# Patient Record
Sex: FEMALE | Race: BLACK OR AFRICAN AMERICAN | NOT HISPANIC OR LATINO | URBAN - METROPOLITAN AREA
[De-identification: names, ages, dates, MRNs, and addresses within clinical notes are randomized per-mention and may not be internally consistent; named-entity substitution may affect disease eponyms.]

---

## 2023-08-23 LAB
APPEARANCE, URINE: ABNORMAL
BILIRUBIN, URINE: NEGATIVE
BLOOD, URINE: NEGATIVE
COLOR, URINE: YELLOW
GLUCOSE, URINE: ABNORMAL MG/DL
KETONES, URINE: NEGATIVE MG/DL
LEUKOCYTE ESTERASE, URINE: NEGATIVE
NITRITE, URINE: NEGATIVE
PH, URINE: 6 (ref 5–8)
PROTEIN, URINE: NEGATIVE MG/DL
SPECIFIC GRAVITY, URINE: 1.02 (ref 1–1.03)
UROBILINOGEN, URINE: <2 MG/DL (ref 0–1.9)

## 2023-08-25 LAB — URINE CULTURE: ABNORMAL

## 2023-10-15 PROBLEM — R39.89 ABNORMAL COMPLIANCE OF BLADDER: Status: ACTIVE | Noted: 2023-10-15

## 2023-10-15 PROBLEM — N39.46 MIXED STRESS AND URGE URINARY INCONTINENCE: Status: ACTIVE | Noted: 2023-10-15

## 2023-10-15 PROBLEM — R32 URINARY INCONTINENCE: Status: ACTIVE | Noted: 2023-10-15

## 2023-10-15 PROBLEM — N95.2 ATROPHIC VAGINITIS: Status: ACTIVE | Noted: 2023-10-15

## 2023-10-15 PROBLEM — N81.11 MIDLINE CYSTOCELE: Status: ACTIVE | Noted: 2023-10-15

## 2023-10-15 PROBLEM — R92.8 ABNORMAL MAMMOGRAM: Status: ACTIVE | Noted: 2023-10-15

## 2023-10-15 PROBLEM — R35.1 NOCTURIA: Status: ACTIVE | Noted: 2023-10-15

## 2023-10-15 PROBLEM — N81.89 PELVIC FLOOR WEAKNESS: Status: ACTIVE | Noted: 2023-10-15

## 2023-10-15 PROBLEM — R10.2 PELVIC PAIN IN FEMALE: Status: ACTIVE | Noted: 2023-10-15

## 2023-10-15 PROBLEM — I10 HYPERTENSION: Status: ACTIVE | Noted: 2023-10-15

## 2023-10-15 PROBLEM — N81.10 CYSTOCELE, UNSPECIFIED: Status: ACTIVE | Noted: 2023-10-15

## 2023-10-15 RX ORDER — METOPROLOL TARTRATE 100 MG/1
TABLET ORAL
COMMUNITY
Start: 2014-07-14

## 2023-10-15 RX ORDER — HYDROCHLOROTHIAZIDE 25 MG/1
TABLET ORAL
COMMUNITY
Start: 2015-10-19

## 2023-10-15 RX ORDER — METFORMIN HYDROCHLORIDE 1000 MG/1
TABLET ORAL
COMMUNITY
Start: 2014-07-14

## 2023-10-15 RX ORDER — AMLODIPINE BESYLATE 5 MG/1
TABLET ORAL
COMMUNITY
Start: 2015-12-08

## 2023-10-15 RX ORDER — LEVOFLOXACIN 750 MG/1
750 TABLET ORAL
COMMUNITY
End: 2023-10-17 | Stop reason: ALTCHOICE

## 2023-10-15 RX ORDER — LOSARTAN POTASSIUM 100 MG/1
TABLET ORAL
COMMUNITY
Start: 2014-07-14

## 2023-10-15 RX ORDER — INSULIN PUMP SYRINGE, 3 ML
EACH MISCELLANEOUS
COMMUNITY

## 2023-10-15 RX ORDER — VIBEGRON 75 MG/1
1 TABLET, FILM COATED ORAL DAILY
COMMUNITY
End: 2023-10-17

## 2023-10-15 RX ORDER — WARFARIN SODIUM 5 MG/1
TABLET ORAL
COMMUNITY
Start: 2014-07-14

## 2023-10-17 ENCOUNTER — OFFICE VISIT (OUTPATIENT)
Dept: UROLOGY | Facility: CLINIC | Age: 68
End: 2023-10-17
Payer: COMMERCIAL

## 2023-10-17 VITALS
WEIGHT: 183.6 LBS | DIASTOLIC BLOOD PRESSURE: 63 MMHG | HEART RATE: 72 BPM | BODY MASS INDEX: 31.51 KG/M2 | SYSTOLIC BLOOD PRESSURE: 134 MMHG

## 2023-10-17 DIAGNOSIS — N95.2 ATROPHIC VAGINITIS: ICD-10-CM

## 2023-10-17 DIAGNOSIS — R10.2 PELVIC PAIN IN FEMALE: ICD-10-CM

## 2023-10-17 DIAGNOSIS — N39.46 MIXED STRESS AND URGE URINARY INCONTINENCE: Primary | ICD-10-CM

## 2023-10-17 DIAGNOSIS — N81.11 MIDLINE CYSTOCELE: ICD-10-CM

## 2023-10-17 DIAGNOSIS — R32 URINARY INCONTINENCE, UNSPECIFIED TYPE: ICD-10-CM

## 2023-10-17 DIAGNOSIS — R35.1 NOCTURIA: ICD-10-CM

## 2023-10-17 LAB
POC APPEARANCE, URINE: CLEAR
POC BILIRUBIN, URINE: NEGATIVE
POC BLOOD, URINE: NEGATIVE
POC COLOR, URINE: YELLOW
POC GLUCOSE, URINE: NEGATIVE MG/DL
POC KETONES, URINE: NEGATIVE MG/DL
POC LEUKOCYTES, URINE: NEGATIVE
POC NITRITE,URINE: NEGATIVE
POC PH, URINE: 7 PH
POC PROTEIN, URINE: NEGATIVE MG/DL
POC SPECIFIC GRAVITY, URINE: 1.02
POC UROBILINOGEN, URINE: 0.2 EU/DL

## 2023-10-17 PROCEDURE — 81003 URINALYSIS AUTO W/O SCOPE: CPT | Mod: QW | Performed by: OBSTETRICS & GYNECOLOGY

## 2023-10-17 PROCEDURE — 87086 URINE CULTURE/COLONY COUNT: CPT | Mod: CMCLAB | Performed by: OBSTETRICS & GYNECOLOGY

## 2023-10-17 PROCEDURE — 3075F SYST BP GE 130 - 139MM HG: CPT | Performed by: OBSTETRICS & GYNECOLOGY

## 2023-10-17 PROCEDURE — 51798 US URINE CAPACITY MEASURE: CPT | Performed by: OBSTETRICS & GYNECOLOGY

## 2023-10-17 PROCEDURE — 99213 OFFICE O/P EST LOW 20 MIN: CPT | Performed by: OBSTETRICS & GYNECOLOGY

## 2023-10-17 PROCEDURE — 3078F DIAST BP <80 MM HG: CPT | Performed by: OBSTETRICS & GYNECOLOGY

## 2023-10-17 PROCEDURE — 1036F TOBACCO NON-USER: CPT | Performed by: OBSTETRICS & GYNECOLOGY

## 2023-10-17 ASSESSMENT — ENCOUNTER SYMPTOMS
DEPRESSION: 0
OCCASIONAL FEELINGS OF UNSTEADINESS: 0
LOSS OF SENSATION IN FEET: 0
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
## 2023-10-18 LAB — BACTERIA UR CULT: NORMAL

## 2023-11-17 ENCOUNTER — APPOINTMENT (OUTPATIENT)
Dept: UROLOGY | Facility: CLINIC | Age: 68
End: 2023-11-17
Payer: COMMERCIAL

## 2024-05-25 PROCEDURE — 99232 SBSQ HOSP IP/OBS MODERATE 35: CPT | Performed by: EMERGENCY MEDICINE

## 2024-10-14 ENCOUNTER — APPOINTMENT (OUTPATIENT)
Dept: OBSTETRICS AND GYNECOLOGY | Facility: CLINIC | Age: 69
End: 2024-10-14
Payer: COMMERCIAL

## 2024-10-14 VITALS
DIASTOLIC BLOOD PRESSURE: 69 MMHG | HEIGHT: 64 IN | BODY MASS INDEX: 31 KG/M2 | WEIGHT: 181.6 LBS | SYSTOLIC BLOOD PRESSURE: 160 MMHG

## 2024-10-14 DIAGNOSIS — Z78.0 MENOPAUSE: Primary | ICD-10-CM

## 2024-10-14 DIAGNOSIS — Z01.419 ENCOUNTER FOR WELL WOMAN EXAM WITH ROUTINE GYNECOLOGICAL EXAM: ICD-10-CM

## 2024-10-14 PROCEDURE — 1126F AMNT PAIN NOTED NONE PRSNT: CPT | Performed by: OBSTETRICS & GYNECOLOGY

## 2024-10-14 PROCEDURE — 1159F MED LIST DOCD IN RCRD: CPT | Performed by: OBSTETRICS & GYNECOLOGY

## 2024-10-14 PROCEDURE — 1036F TOBACCO NON-USER: CPT | Performed by: OBSTETRICS & GYNECOLOGY

## 2024-10-14 PROCEDURE — 3077F SYST BP >= 140 MM HG: CPT | Performed by: OBSTETRICS & GYNECOLOGY

## 2024-10-14 PROCEDURE — 99387 INIT PM E/M NEW PAT 65+ YRS: CPT | Performed by: OBSTETRICS & GYNECOLOGY

## 2024-10-14 PROCEDURE — 3008F BODY MASS INDEX DOCD: CPT | Performed by: OBSTETRICS & GYNECOLOGY

## 2024-10-14 PROCEDURE — 1160F RVW MEDS BY RX/DR IN RCRD: CPT | Performed by: OBSTETRICS & GYNECOLOGY

## 2024-10-14 PROCEDURE — 3078F DIAST BP <80 MM HG: CPT | Performed by: OBSTETRICS & GYNECOLOGY

## 2024-10-14 ASSESSMENT — PAIN SCALES - GENERAL: PAINLEVEL: 0-NO PAIN

## 2024-10-14 ASSESSMENT — ENCOUNTER SYMPTOMS
NEUROLOGICAL NEGATIVE: 0
EYES NEGATIVE: 0
CONSTITUTIONAL NEGATIVE: 0
MUSCULOSKELETAL NEGATIVE: 0
PSYCHIATRIC NEGATIVE: 0
FREQUENCY: 1
ARTHRALGIAS: 1
DYSURIA: 1
HEMATOLOGIC/LYMPHATIC NEGATIVE: 0
ENDOCRINE NEGATIVE: 0
GASTROINTESTINAL NEGATIVE: 0
ALLERGIC/IMMUNOLOGIC NEGATIVE: 0
CARDIOVASCULAR NEGATIVE: 0
RESPIRATORY NEGATIVE: 0

## 2024-10-14 ASSESSMENT — PATIENT HEALTH QUESTIONNAIRE - PHQ9
1. LITTLE INTEREST OR PLEASURE IN DOING THINGS: NOT AT ALL
2. FEELING DOWN, DEPRESSED OR HOPELESS: NOT AT ALL
SUM OF ALL RESPONSES TO PHQ9 QUESTIONS 1 AND 2: 0
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
## 2024-12-03 ENCOUNTER — OFFICE VISIT (OUTPATIENT)
Dept: URGENT CARE | Age: 69
End: 2024-12-03
Payer: COMMERCIAL

## 2024-12-03 VITALS
HEIGHT: 64 IN | DIASTOLIC BLOOD PRESSURE: 74 MMHG | HEART RATE: 78 BPM | WEIGHT: 171 LBS | OXYGEN SATURATION: 97 % | TEMPERATURE: 97.8 F | RESPIRATION RATE: 18 BRPM | BODY MASS INDEX: 29.19 KG/M2 | SYSTOLIC BLOOD PRESSURE: 134 MMHG

## 2024-12-03 DIAGNOSIS — L30.4 INTERTRIGO: Primary | ICD-10-CM

## 2024-12-03 RX ORDER — GAUZE BANDAGE 4" X 4"
1 BANDAGE TOPICAL AS NEEDED
Qty: 100 EACH | Refills: 0 | Status: SHIPPED | OUTPATIENT
Start: 2024-12-03

## 2024-12-03 RX ORDER — CLOTRIMAZOLE 1 %
CREAM (GRAM) TOPICAL 2 TIMES DAILY
Qty: 24 G | Refills: 0 | Status: SHIPPED | OUTPATIENT
Start: 2024-12-03 | End: 2024-12-17

## 2024-12-03 ASSESSMENT — PAIN SCALES - GENERAL: PAINLEVEL_OUTOF10: 4

## 2024-12-03 NOTE — PROGRESS NOTES
Subjective   Patient ID: Carolyn Maynard is a 69 y.o. female. They present today with a chief complaint of Rash (Rash upper thighs near crease in leg. X 2 weeks...pain).    History of Present Illness  Patient is a very pleasant 69 year old female presenting with rash in groin region. Wears a brief due to history of bladder prolapse and incontinence. Over the last 2 weeks has had burning pain in the skin folds. Using 4x4s to keep area dry. Repots she is trying to get scheduled for botox injections to control bladder symptoms but due to difficulties with transportation has not been able to go to appointment. She feels that the untreated bladder prolapse is contributing to rash. She has not tried anything else for symptoms. Denies rashes anywhere else including under breast.      History provided by:  Patient   used: No    Rash        Past Medical History  Allergies as of 2024    (No Known Allergies)       (Not in a hospital admission)       Past Medical History:   Diagnosis Date    Essential (primary) hypertension 2014    Hypertension    Pain in leg, unspecified 2014    Leg pain    Personal history of other diseases of the circulatory system 2014    History of cardiac disorder    Personal history of other endocrine, nutritional and metabolic disease 2014    History of thyroid disease    Personal history of other endocrine, nutritional and metabolic disease 2014    History of diabetes mellitus       Past Surgical History:   Procedure Laterality Date    CATARACT EXTRACTION  2014    Cataract Surgery     SECTION, CLASSIC  2014     Section        reports that she has never smoked. She has never used smokeless tobacco. She reports that she does not drink alcohol and does not use drugs.    Review of Systems  Review of Systems   Skin:  Positive for rash.                                  Objective    Vitals:    24 1431   BP: 134/74   BP  "Location: Right arm   Patient Position: Sitting   BP Cuff Size: Large adult   Pulse: 78   Resp: 18   Temp: 36.6 °C (97.8 °F)   TempSrc: Oral   SpO2: 97%   Weight: 77.6 kg (171 lb)   Height: 1.626 m (5' 4\")     No LMP recorded. Patient is postmenopausal.    Physical Exam  Constitutional:       General: She is not in acute distress.     Appearance: Normal appearance. She is normal weight. She is not ill-appearing or toxic-appearing.   Eyes:      General: No scleral icterus.        Right eye: No discharge.         Left eye: No discharge.      Conjunctiva/sclera: Conjunctivae normal.   Neck:      Trachea: Phonation normal.   Cardiovascular:      Rate and Rhythm: Normal rate and regular rhythm.      Heart sounds: Normal heart sounds. No murmur heard.     No friction rub. No gallop.   Pulmonary:      Effort: No respiratory distress.      Breath sounds: Normal breath sounds. No stridor. No wheezing, rhonchi or rales.   Skin:     Findings: Rash present.      Comments: Candida like rash under brief band/inguinal creases    Neurological:      Mental Status: She is alert.      Gait: Gait normal.   Psychiatric:         Mood and Affect: Mood normal.         Procedures    Point of Care Test & Imaging Results from this visit  No results found for this visit on 12/03/24.   No results found.    Diagnostic study results (if any) were reviewed by Barb Yan PA-C.    Assessment/Plan   Allergies, medications, history, and pertinent labs/EKGs/Imaging reviewed by Barb Yan PA-C.     Medical Decision Making  Patient presenting with rash x 2 weeks. Hemodynamically stable. Exam with intertrigo rash to bilateral inguinal folds.  No rash under breasts. No cellulitis. Will start topical treatment. She is also requesting 4x4s to use as barrier in skin folds which was sent.     Orders and Diagnoses  Diagnoses and all orders for this visit:  Intertrigo  -     clotrimazole (Lotrimin) 1 % cream; Apply topically 2 times a day for 14 " "days.  -     gauze bandage 4 X 4 \" bandage; Apply 1 Piece topically if needed (as needed for moisture).      Medical Admin Record      Patient disposition: Home    Electronically signed by Barb Yan PA-C  4:52 PM      "